# Patient Record
Sex: MALE | Race: WHITE
[De-identification: names, ages, dates, MRNs, and addresses within clinical notes are randomized per-mention and may not be internally consistent; named-entity substitution may affect disease eponyms.]

---

## 2022-07-28 ENCOUNTER — HOSPITAL ENCOUNTER (EMERGENCY)
Dept: HOSPITAL 47 - EC | Age: 27
Discharge: HOME | End: 2022-07-28
Payer: COMMERCIAL

## 2022-07-28 VITALS — SYSTOLIC BLOOD PRESSURE: 138 MMHG | HEART RATE: 88 BPM | TEMPERATURE: 97.7 F | DIASTOLIC BLOOD PRESSURE: 74 MMHG

## 2022-07-28 VITALS — RESPIRATION RATE: 18 BRPM

## 2022-07-28 DIAGNOSIS — Z00.8: Primary | ICD-10-CM

## 2022-07-28 LAB
ALBUMIN SERPL-MCNC: 4.5 G/DL (ref 3.5–5)
ALP SERPL-CCNC: 67 U/L (ref 38–126)
ALT SERPL-CCNC: 95 U/L (ref 4–49)
ANION GAP SERPL CALC-SCNC: 8 MMOL/L
AST SERPL-CCNC: 44 U/L (ref 17–59)
BASOPHILS # BLD AUTO: 0.1 K/UL (ref 0–0.2)
BASOPHILS NFR BLD AUTO: 1 %
BUN SERPL-SCNC: 17 MG/DL (ref 9–20)
CALCIUM SPEC-MCNC: 9.3 MG/DL (ref 8.4–10.2)
CHLORIDE SERPL-SCNC: 108 MMOL/L (ref 98–107)
CO2 SERPL-SCNC: 25 MMOL/L (ref 22–30)
EOSINOPHIL # BLD AUTO: 0.4 K/UL (ref 0–0.7)
EOSINOPHIL NFR BLD AUTO: 5 %
ERYTHROCYTE [DISTWIDTH] IN BLOOD BY AUTOMATED COUNT: 5.75 M/UL (ref 4.3–5.9)
ERYTHROCYTE [DISTWIDTH] IN BLOOD: 13.1 % (ref 11.5–15.5)
GLUCOSE SERPL-MCNC: 89 MG/DL (ref 74–99)
HCT VFR BLD AUTO: 50.1 % (ref 39–53)
HGB BLD-MCNC: 16.5 GM/DL (ref 13–17.5)
LYMPHOCYTES # SPEC AUTO: 2.1 K/UL (ref 1–4.8)
LYMPHOCYTES NFR SPEC AUTO: 26 %
MCH RBC QN AUTO: 28.8 PG (ref 25–35)
MCHC RBC AUTO-ENTMCNC: 33 G/DL (ref 31–37)
MCV RBC AUTO: 87.1 FL (ref 80–100)
MONOCYTES # BLD AUTO: 0.3 K/UL (ref 0–1)
MONOCYTES NFR BLD AUTO: 4 %
NEUTROPHILS # BLD AUTO: 5.1 K/UL (ref 1.3–7.7)
NEUTROPHILS NFR BLD AUTO: 63 %
PH UR: 6.5 [PH] (ref 5–8)
PLATELET # BLD AUTO: 136 K/UL (ref 150–450)
POTASSIUM SERPL-SCNC: 4 MMOL/L (ref 3.5–5.1)
PROT SERPL-MCNC: 6.7 G/DL (ref 6.3–8.2)
SODIUM SERPL-SCNC: 141 MMOL/L (ref 137–145)
SP GR UR: 1.03 (ref 1–1.03)
UROBILINOGEN UR QL STRIP: 3 MG/DL (ref ?–2)
WBC # BLD AUTO: 8.1 K/UL (ref 3.8–10.6)

## 2022-07-28 PROCEDURE — 81003 URINALYSIS AUTO W/O SCOPE: CPT

## 2022-07-28 PROCEDURE — 82075 ASSAY OF BREATH ETHANOL: CPT

## 2022-07-28 PROCEDURE — 99283 EMERGENCY DEPT VISIT LOW MDM: CPT

## 2022-07-28 PROCEDURE — 85025 COMPLETE CBC W/AUTO DIFF WBC: CPT

## 2022-07-28 PROCEDURE — 80306 DRUG TEST PRSMV INSTRMNT: CPT

## 2022-07-28 PROCEDURE — 80053 COMPREHEN METABOLIC PANEL: CPT

## 2022-07-28 PROCEDURE — 36415 COLL VENOUS BLD VENIPUNCTURE: CPT

## 2022-07-28 NOTE — ED
Psych HPI





- General


Source: patient, family, EMS, RN notes reviewed


Mode of arrival: EMS


Limitations: no limitations





<Camilo Tavarez - Last Filed: 07/28/22 15:06>





<Joni Cam - Last Filed: 07/28/22 22:55>





- General


Chief Complaint: Psychiatric Symptoms


Stated Complaint: mental health


Time Seen by Provider: 07/28/22 14:02





- History of Present Illness


Initial Comments: 


27-year-old male presents emergency Department with via EMS for a psychiatric 

evaluation.  Mother states patient has had some aggressive outbursts but states 

only happened once a month.  He has had increase of his Abilify to 50 mg 3 times

daily.  Patient does admit that he becomes upset when he cannot do something 

that he would prefer to do states he does not want to harm anybody is not 

himself.  Denies being suicidal.  Mom states that she feels that he needs 

further care and evaluation.


 (Camilo Tavarez)





- Related Data


                                Home Medications











 Medication  Instructions  Recorded  Confirmed


 


ARIPiprazole [Abilify] 15 mg PO TID 07/28/22 07/28/22


 


Dextroamphetamine/Amphetamine 60 mg PO DAILY 07/28/22 07/28/22





[Adderall Xr 30 mg Capsule]   


 


lamoTRIgine [LaMICtal] 100 mg PO BID 07/28/22 07/28/22











                                    Allergies











Allergy/AdvReac Type Severity Reaction Status Date / Time


 


No Known Allergies Allergy   Unverified 07/28/22 15:15














Review of Systems


ROS Other: All systems not noted in ROS Statement are negative.





<Camilo Tavarez - Last Filed: 07/28/22 15:06>


ROS Other: All systems not noted in ROS Statement are negative.





<Joni Cam - Last Filed: 07/28/22 22:55>


ROS Statement: 


Those systems with pertinent positive or pertinent negative responses have been 

documented in the HPI.








Past Medical History


Additional Past Medical History / Comment(s): autism


History of Any Multi-Drug Resistant Organisms: None Reported


Past Surgical History: No Surgical Hx Reported


Past Psychological History: No Psychological Hx Reported


Smoking Status: Never smoker


Past Alcohol Use History: None Reported


Past Drug Use History: None Reported





<Camilo Tavarez - Last Filed: 07/28/22 15:06>





General Exam


General appearance: alert, in no apparent distress


Head exam: Present: atraumatic, normocephalic, normal inspection


Eye exam: Present: normal appearance, PERRL, EOMI.  Absent: scleral icterus, 

conjunctival injection, periorbital swelling


ENT exam: Present: normal exam, normal oropharynx, mucous membranes moist, TM's 

normal bilaterally


Neck exam: Present: normal inspection, full ROM.  Absent: tenderness, 

meningismus, lymphadenopathy


Respiratory exam: Present: normal lung sounds bilaterally.  Absent: respiratory 

distress, wheezes, rales, rhonchi, stridor


Cardiovascular Exam: Present: regular rate, normal rhythm, normal heart sounds. 

 Absent: systolic murmur, diastolic murmur, rubs, gallop, clicks


GI/Abdominal exam: Present: soft, normal bowel sounds.  Absent: distended, 

tenderness, guarding, rebound, rigid


Neurological exam: Present: alert


Psychiatric exam: Present: normal affect


Skin exam: Present: warm, dry, intact, normal color.  Absent: rash





<Camilo Tavarez - Last Filed: 07/28/22 15:06>





Course


                                   Vital Signs











  07/28/22 07/28/22 07/28/22





  14:03 15:00 16:26


 


Temperature 98.4 F  


 


Pulse Rate 78 76 76


 


Respiratory 18 18 18





Rate   


 


Blood Pressure 128/74 126/78 126/74


 


O2 Sat by Pulse 100 100 100





Oximetry   














  07/28/22





  19:08


 


Temperature 97.7 F


 


Pulse Rate 88


 


Respiratory 18





Rate 


 


Blood Pressure 138/74


 


O2 Sat by Pulse 100





Oximetry 














Medical Decision Making





- Lab Data


Result diagrams: 


                                 07/28/22 14:43





                                 07/28/22 14:43





<Joni Cam - Last Filed: 07/28/22 22:55>





- Medical Decision Making





Patient signed out to me pending EPS evaluation.  Was medically cleared by the 

mid-level provider.Was notified by EPS of the patient's cleared for discharge 

home.  Patient is discharged home in stable condition. (Joni Cam)





- Lab Data


                                   Lab Results











  07/28/22 07/28/22 07/28/22 Range/Units





  14:43 14:43 14:43 


 


WBC   8.1   (3.8-10.6)  k/uL


 


RBC   5.75   (4.30-5.90)  m/uL


 


Hgb   16.5   (13.0-17.5)  gm/dL


 


Hct   50.1   (39.0-53.0)  %


 


MCV   87.1   (80.0-100.0)  fL


 


MCH   28.8   (25.0-35.0)  pg


 


MCHC   33.0   (31.0-37.0)  g/dL


 


RDW   13.1   (11.5-15.5)  %


 


Plt Count   136 L   (150-450)  k/uL


 


MPV   7.3   


 


Neutrophils %   63   %


 


Lymphocytes %   26   %


 


Monocytes %   4   %


 


Eosinophils %   5   %


 


Basophils %   1   %


 


Neutrophils #   5.1   (1.3-7.7)  k/uL


 


Lymphocytes #   2.1   (1.0-4.8)  k/uL


 


Monocytes #   0.3   (0-1.0)  k/uL


 


Eosinophils #   0.4   (0-0.7)  k/uL


 


Basophils #   0.1   (0-0.2)  k/uL


 


Sodium     (137-145)  mmol/L


 


Potassium     (3.5-5.1)  mmol/L


 


Chloride     ()  mmol/L


 


Carbon Dioxide     (22-30)  mmol/L


 


Anion Gap     mmol/L


 


BUN     (9-20)  mg/dL


 


Creatinine     (0.66-1.25)  mg/dL


 


Est GFR (CKD-EPI)AfAm     (>60 ml/min/1.73 sqM)  


 


Est GFR (CKD-EPI)NonAf     (>60 ml/min/1.73 sqM)  


 


Glucose     (74-99)  mg/dL


 


Calcium     (8.4-10.2)  mg/dL


 


Total Bilirubin     (0.2-1.3)  mg/dL


 


AST     (17-59)  U/L


 


ALT     (4-49)  U/L


 


Alkaline Phosphatase     ()  U/L


 


Total Protein     (6.3-8.2)  g/dL


 


Albumin     (3.5-5.0)  g/dL


 


Urine Color    Yellow  


 


Urine Appearance    Clear  (Clear)  


 


Urine pH    6.5  (5.0-8.0)  


 


Ur Specific Gravity    1.030  (1.001-1.035)  


 


Urine Protein    Trace H  (Negative)  


 


Urine Glucose (UA)    Negative  (Negative)  


 


Urine Ketones    Negative  (Negative)  


 


Urine Blood    Negative  (Negative)  


 


Urine Nitrite    Negative  (Negative)  


 


Urine Bilirubin    Negative  (Negative)  


 


Urine Urobilinogen    3.0  (<2.0)  mg/dL


 


Ur Leukocyte Esterase    Negative  (Negative)  


 


Urine Opiates Screen  Not Detected    (NotDetected)  


 


Ur Oxycodone Screen  Not Detected    (NotDetected)  


 


Urine Methadone Screen  Not Detected    (NotDetected)  


 


Ur Propoxyphene Screen  Not Detected    (NotDetected)  


 


Ur Barbiturates Screen  Not Detected    (NotDetected)  


 


U Tricyclic Antidepress  Not Detected    (NotDetected)  


 


Ur Phencyclidine Scrn  Not Detected    (NotDetected)  


 


Ur Amphetamines Screen  Detected H    (NotDetected)  


 


U Methamphetamines Scrn  Not Detected    (NotDetected)  


 


U Benzodiazepines Scrn  Not Detected    (NotDetected)  


 


Urine Cocaine Screen  Not Detected    (NotDetected)  


 


U Marijuana (THC) Screen  Not Detected    (NotDetected)  














  07/28/22 Range/Units





  14:43 


 


WBC   (3.8-10.6)  k/uL


 


RBC   (4.30-5.90)  m/uL


 


Hgb   (13.0-17.5)  gm/dL


 


Hct   (39.0-53.0)  %


 


MCV   (80.0-100.0)  fL


 


MCH   (25.0-35.0)  pg


 


MCHC   (31.0-37.0)  g/dL


 


RDW   (11.5-15.5)  %


 


Plt Count   (150-450)  k/uL


 


MPV   


 


Neutrophils %   %


 


Lymphocytes %   %


 


Monocytes %   %


 


Eosinophils %   %


 


Basophils %   %


 


Neutrophils #   (1.3-7.7)  k/uL


 


Lymphocytes #   (1.0-4.8)  k/uL


 


Monocytes #   (0-1.0)  k/uL


 


Eosinophils #   (0-0.7)  k/uL


 


Basophils #   (0-0.2)  k/uL


 


Sodium  141  (137-145)  mmol/L


 


Potassium  4.0  (3.5-5.1)  mmol/L


 


Chloride  108 H  ()  mmol/L


 


Carbon Dioxide  25  (22-30)  mmol/L


 


Anion Gap  8  mmol/L


 


BUN  17  (9-20)  mg/dL


 


Creatinine  0.92  (0.66-1.25)  mg/dL


 


Est GFR (CKD-EPI)AfAm  >90  (>60 ml/min/1.73 sqM)  


 


Est GFR (CKD-EPI)NonAf  >90  (>60 ml/min/1.73 sqM)  


 


Glucose  89  (74-99)  mg/dL


 


Calcium  9.3  (8.4-10.2)  mg/dL


 


Total Bilirubin  0.5  (0.2-1.3)  mg/dL


 


AST  44  (17-59)  U/L


 


ALT  95 H  (4-49)  U/L


 


Alkaline Phosphatase  67  ()  U/L


 


Total Protein  6.7  (6.3-8.2)  g/dL


 


Albumin  4.5  (3.5-5.0)  g/dL


 


Urine Color   


 


Urine Appearance   (Clear)  


 


Urine pH   (5.0-8.0)  


 


Ur Specific Gravity   (1.001-1.035)  


 


Urine Protein   (Negative)  


 


Urine Glucose (UA)   (Negative)  


 


Urine Ketones   (Negative)  


 


Urine Blood   (Negative)  


 


Urine Nitrite   (Negative)  


 


Urine Bilirubin   (Negative)  


 


Urine Urobilinogen   (<2.0)  mg/dL


 


Ur Leukocyte Esterase   (Negative)  


 


Urine Opiates Screen   (NotDetected)  


 


Ur Oxycodone Screen   (NotDetected)  


 


Urine Methadone Screen   (NotDetected)  


 


Ur Propoxyphene Screen   (NotDetected)  


 


Ur Barbiturates Screen   (NotDetected)  


 


U Tricyclic Antidepress   (NotDetected)  


 


Ur Phencyclidine Scrn   (NotDetected)  


 


Ur Amphetamines Screen   (NotDetected)  


 


U Methamphetamines Scrn   (NotDetected)  


 


U Benzodiazepines Scrn   (NotDetected)  


 


Urine Cocaine Screen   (NotDetected)  


 


U Marijuana (THC) Screen   (NotDetected)  














Disposition





<Camilo Tavarez - Last Filed: 07/28/22 15:06>


Is patient prescribed a controlled substance at d/c from ED?: No





<Joni Cam - Last Filed: 07/28/22 22:55>


Clinical Impression: 


 Encounter for psychiatric assessment





Disposition: HOME SELF-CARE


Condition: Good


Additional Instructions: 


Refer to discharge safety plan.


Referrals: 


Frandy Brennan MD [Primary Care Provider] - 1-2 days